# Patient Record
Sex: FEMALE | Race: WHITE | Employment: FULL TIME | URBAN - METROPOLITAN AREA
[De-identification: names, ages, dates, MRNs, and addresses within clinical notes are randomized per-mention and may not be internally consistent; named-entity substitution may affect disease eponyms.]

---

## 2020-11-20 ENCOUNTER — OFFICE VISIT (OUTPATIENT)
Dept: HEMATOLOGY | Age: 50
End: 2020-11-20
Payer: COMMERCIAL

## 2020-11-20 VITALS
WEIGHT: 154.2 LBS | DIASTOLIC BLOOD PRESSURE: 86 MMHG | TEMPERATURE: 98.1 F | OXYGEN SATURATION: 99 % | SYSTOLIC BLOOD PRESSURE: 124 MMHG | RESPIRATION RATE: 16 BRPM | HEART RATE: 76 BPM | HEIGHT: 69 IN | BODY MASS INDEX: 22.84 KG/M2

## 2020-11-20 DIAGNOSIS — Q44.6 POLYCYSTIC LIVER DISEASE: Primary | ICD-10-CM

## 2020-11-20 PROBLEM — Q61.3 POLYCYSTIC KIDNEY: Status: ACTIVE | Noted: 2020-11-20

## 2020-11-20 PROBLEM — I10 HYPERTENSION: Status: ACTIVE | Noted: 2020-11-20

## 2020-11-20 LAB
HBV SURFACE AB SER QL: NONREACTIVE
HBV SURFACE AB SER-ACNC: <3.1 MIU/ML
HBV SURFACE AG SER QL: 0.39 INDEX
HBV SURFACE AG SER QL: NEGATIVE

## 2020-11-20 PROCEDURE — 99203 OFFICE O/P NEW LOW 30 MIN: CPT | Performed by: INTERNAL MEDICINE

## 2020-11-20 RX ORDER — LOSARTAN POTASSIUM 25 MG/1
TABLET ORAL
COMMUNITY

## 2020-11-20 RX ORDER — FLUCONAZOLE 100 MG/1
TABLET ORAL
COMMUNITY
Start: 2020-11-04

## 2020-11-20 NOTE — Clinical Note
11/29/20 Patient: Pascual Angela YOB: 1970 Date of Visit: 11/20/2020 Candice Jaffe MD 
[place] [address] [address] [facility] [id number] VIA In Basket Dear Candice Jaffe MD, Thank you for referring Ms. Pascual Angela to [address] for evaluation. My notes for this consultation are attached. If you have questions, please do not hesitate to call me. I look forward to following your patient along with you. Sincerely, Royer Chopra MD

## 2020-11-20 NOTE — PROGRESS NOTES
Vianca Hudson MD, [address], Anna Brian MD, MPH      REJI Henson, [facility]-BC     Leila Hanson, [facility]   Chery Nolasco GM-LAUREN Beckman, [facility]       Susan Bello [address]    at [address], [facility] [place]  22.    [phone]    FAX: [address] [place] [address], [address]    [phone]    FAX: [phone]       Patient Care Team:  Unknown, Provider as PCP - General      Problem List  Never Reviewed          Codes Class Noted    Hypertension ICD-10-CM: I10  ICD-9-CM: 401.9  11/20/2020        Polycystic liver disease ICD-10-CM: Q44.6  ICD-9-CM: 751.62  11/20/2020        Polycystic kidney ICD-10-CM: Q61.3  ICD-9-CM: 753.12  11/20/2020                The clinicians listed above have asked me to see Andres Harden in consultation regarding polycystic liver disease. All medical records sent by the referring physicians were reviewed including imaging studies     The patient is a 52 y.o.  female who was found to have polycystic renal disease and liver diease in 2014. The patient has no symptoms which can be attributed to the liver disorder. The patient is not currently experiencing the following symptoms of liver disease:    pain in the right side over the liver,     The patient completes all daily activities without any functional limitations. ASSESSMENT AND PLAN:  Polycystic liver disease   The patient has innumerable cysts of varying sizes throughout out the entire liver. The liver is enlarged but is not disfiguring the abdomen.     Liver transaminases are normal.  ALP is normal.  Liver function is normal.  The platelet count is normal.      Serologic testing for causes of chronic liver disease was ordered. Patients with polycystic liver disease never develop liver dysfunction and the cysts do not lead to liver failure or cirrhosis. These patients can develop massive hepatomegaly from all the cysts which can be both disfiguring and uncomfortable from severe abdominal distention. Surgical resection of the cysts is not possible since they are throughout the liver and if only a part of the liver is resected it will regrow with cysts. Routine scanning of the liver to assess for a change in size of the cysts is not indicated or helpful in the management. If the patient is a candidate for renal trnasplant she should undergo combined liver and renal transplant. Screening for Hepatocellular Carcinoma  HCC screening is not necessary     Treatment of other medical problems in patients with chronic liver disease  There are no contraindications for the patient to take most medications that are necessary for treatment of other medical issues. Vaccinations   The need for vaccination against viral hepatitis A and B will be assessed with serologic and instituted as appropriate. Routine vaccinations against other bacterial and viral agents can be performed as indicated. Annual flu vaccination should be administered if indicated. ALLERGIES  Allergies   Allergen Reactions    Hydrochlorothiazide Cough    Lisinopril Cough       MEDICATIONS  Current Outpatient Medications   Medication Sig    fluconazole (DIFLUCAN) 100 mg tablet TAKE 1 TABLET BY MOUTH EVERY DAY FOR 7 DAYS    losartan (COZAAR) 25 mg tablet losartan 25 mg tablet   Take 1 tablet every day by oral route for 90 days. No current facility-administered medications for this visit. SYSTEM REVIEW NOT RELATED TO LIVER DISEASE OR REVIEWED ABOVE:  Constitution systems: Negative for fever, chills, weight gain, weight loss. Eyes: Negative for visual changes.   ENT: Negative for sore throat, painful swallowing. Respiratory: Negative for cough, hemoptysis, SOB. Cardiology: Negative for chest pain, palpitations. GI:  Negative for constipation or diarrhea. : Negative for urinary frequency, dysuria, hematuria, nocturia. Skin: Negative for rash. Hematology: Negative for easy bruising, blood clots. Musculo-skelatal: Negative for back pain, muscle pain, weakness. Neurologic: Negative for headaches, dizziness, vertigo, memory problems not related to HE. Psychology: Negative for anxiety, depression. FAMILY HISTORY:  The father  and had polycystic kidney disease. The mother Has/had the following chronic disease(s): heart disease. There is no family history of liver disease. SOCIAL HISTORY:  The patient is . The patient has 1 child, 2 stepchildren,   The patient stopped using tobacco products in 2017    The patient consumes 3 alcoholic beverages per week. The patient currently works full time as  for [employer]. PHYSICAL EXAMINATION:  VS: per nursing note  General: No acute distress. Eyes: Sclera anicteric. ENT: No oral lesions. Thyroid normal.  Nodes: No adenopathy. Skin: No spider angiomata. No jaundice. No palmar erythema. Respiratory: Lungs clear to auscultation. Cardiovascular: Regular heart rate. No murmurs. No JVD. Abdomen: Soft non-tender, liver size normal to percussion/palpation. Spleen not palpable. No obvious ascites. Extremities: No edema. No muscle wasting. No gross arthritic changes. Neurologic: Alert and oriented. Cranial nerves grossly intact. No asterixis. PHYSICAL EXAMINATION:  Visit Vitals  /86 (BP 1 Location: Right arm, BP Patient Position: Sitting)   Pulse 76   Temp 98.1 °F (36.7 °C) (Temporal)   Resp 16   Ht 5' 9\" (1.753 m)   Wt 154 lb 3.2 oz (69.9 kg)   SpO2 99%   BMI 22.77 kg/m²     General: No acute distress. Eyes: Sclera anicteric. ENT: No oral lesions.   Thyroid normal.  Nodes: No adenopathy. Skin: No spider angiomata. No jaundice. No palmar erythema. Respiratory: Lungs clear to auscultation. Cardiovascular: Regular heart rate. No murmurs. No JVD. Abdomen: Soft non-tender. Liver size normal to percussion/palpation. Spleen not palpable. No obvious ascites. Extremities: No edema. No muscle wasting. No gross arthritic changes. Neurologic: Alert and oriented. Cranial nerves grossly intact. No asterixis. LABORATORY STUDIES:  From 10/2020  AST/ALT/ALP/T Bili/ALB:  17/13/42/0.4/4.1  WBC/HB/PLT/INR:  NA/BUN/CREAT:  22/1.01    SEROLOGIES:  Not available or performed. Testing was performed today. LIVER HISTOLOGY:  Not available or performed    ENDOSCOPIC PROCEDURES:  Not available or performed    RADIOLOGY:  3/2020. CT scan abdomen without IV contrast.  Innumerable liver cysts consistent with polycystic liver disease. 9/2020. Ultrasound of liver. Normal appearing liver. Numerous cysts throughout the liver. OTHER TESTING:  Not available or performed    FOLLOW-UP:  All of the issues listed above in the Assessment and Plan were discussed with the patient. All questions were answered. The patient expressed a clear understanding of the above. No follow-up at [address] is needed. I would be glad to see the patient back for follow-up at any time in the future if the clinical situation changes.       MD Lizbeth GilliamOklahoma Hospital Association 13 of [address] [place] [address] A, [address] [place] [address] 22.  [phone]  [address]

## 2020-11-20 NOTE — PROGRESS NOTES
Identified pt with two pt identifiers(name and ). Reviewed record in preparation for visit and have obtained necessary documentation. Chief Complaint   Patient presents with    New Patient     Establish Care      Vitals:    20 0907   Temp: 98.1 °F (36.7 °C)   TempSrc: Temporal   Weight: 154 lb 3.2 oz (69.9 kg)   PainSc:   0 - No pain       Health Maintenance Review: Patient reminded of \"due or due soon\" health maintenance. I have asked the patient to contact his/her primary care provider (PCP) for follow-up on his/her health maintenance. Coordination of Care Questionnaire:  :   1) Have you been to an emergency room, urgent care, or hospitalized since your last visit? If yes, where when, and reason for visit? no       2. Have seen or consulted any other health care provider since your last visit? If yes, where when, and reason for visit? NO      Patient is accompanied by self I have received verbal consent from [facility] to discuss any/all medical information while they are present in the room.

## 2020-11-21 LAB
COMMENT, 144067: NORMAL
HBV CORE AB SERPL QL IA: NEGATIVE
HCV AB S/CO SERPL IA: <0.1 S/CO RATIO (ref 0–0.9)

## 2022-03-18 PROBLEM — I10 HYPERTENSION: Status: ACTIVE | Noted: 2020-11-20

## 2022-03-19 PROBLEM — Q61.3 POLYCYSTIC KIDNEY: Status: ACTIVE | Noted: 2020-11-20

## 2022-03-19 PROBLEM — Q44.6 POLYCYSTIC LIVER DISEASE: Status: ACTIVE | Noted: 2020-11-20

## 2023-05-15 RX ORDER — LOSARTAN POTASSIUM 25 MG/1
TABLET ORAL
COMMUNITY

## 2023-05-15 RX ORDER — FLUCONAZOLE 100 MG/1
TABLET ORAL
COMMUNITY
Start: 2020-11-04

## 2023-08-07 ENCOUNTER — OFFICE VISIT (OUTPATIENT)
Age: 53
End: 2023-08-07
Payer: COMMERCIAL

## 2023-08-07 VITALS
TEMPERATURE: 98.8 F | HEIGHT: 69 IN | RESPIRATION RATE: 18 BRPM | WEIGHT: 154.4 LBS | OXYGEN SATURATION: 96 % | DIASTOLIC BLOOD PRESSURE: 86 MMHG | HEART RATE: 68 BPM | BODY MASS INDEX: 22.87 KG/M2 | SYSTOLIC BLOOD PRESSURE: 113 MMHG

## 2023-08-07 DIAGNOSIS — K43.9 SUPRAUMBILICAL HERNIA: ICD-10-CM

## 2023-08-07 PROCEDURE — 3074F SYST BP LT 130 MM HG: CPT | Performed by: SURGERY

## 2023-08-07 PROCEDURE — 3078F DIAST BP <80 MM HG: CPT | Performed by: SURGERY

## 2023-08-07 PROCEDURE — 99203 OFFICE O/P NEW LOW 30 MIN: CPT | Performed by: SURGERY

## 2023-08-07 ASSESSMENT — PATIENT HEALTH QUESTIONNAIRE - PHQ9
SUM OF ALL RESPONSES TO PHQ9 QUESTIONS 1 & 2: 0
SUM OF ALL RESPONSES TO PHQ QUESTIONS 1-9: 0
SUM OF ALL RESPONSES TO PHQ QUESTIONS 1-9: 0
2. FEELING DOWN, DEPRESSED OR HOPELESS: 0
1. LITTLE INTEREST OR PLEASURE IN DOING THINGS: 0
SUM OF ALL RESPONSES TO PHQ QUESTIONS 1-9: 0
SUM OF ALL RESPONSES TO PHQ QUESTIONS 1-9: 0

## 2023-08-07 ASSESSMENT — ENCOUNTER SYMPTOMS
CONSTIPATION: 0
VOMITING: 0
NAUSEA: 0

## 2023-08-07 NOTE — PROGRESS NOTES
Iain Saul is a 50 y.o. female who presents for evaluation of a supraumbilical hernia. Information obtained from patient and review of outside records. Ms. Noe Oliver tells me that she has had an abdominal wall bulge, in the midline above the umbilicus for some time now. The bulge has become somewhat larger. No significant associated discomfort with coughing, sneezing or exertion. No h/o nausea or vomiting. No h/o chronic cough or constipation. Found to have a supraumbilical hernia on CT scan of the abdomen/pelvis in . However, a supraumbilical hernia was not reported on an abdominal CT scan in . She has otherwise been in her usual state of health. Abdominal ultrasound - 2020 - Normal gallbladder, hepatomegaly with innumerable cysts, enlarged right kidney with numerous cysts, small supraumbilical hernia with minimal fluid component. (By report. Films not available. )    CT scan abdomen/pelvis without po/IV contrast - 2022 - Autosomal dominant polycystic kidney disease with involvement of bilateral kidneys and liver. Bilateral renal calculi. Bilateral hyperdense renal lesions, possibly complex cysts, total renal volume 1285 ml, no evidence of ventral abdominal wall hernia. (By report. Films not available.)    Past Medical History:   Diagnosis Date    Hypertension     Supraumbilical hernia 3/3/8475    Ulcer duodenal hemorrhage      Past Surgical History:   Procedure Laterality Date    COLONOSCOPY      UPPER GASTROINTESTINAL ENDOSCOPY       History reviewed. No pertinent family history.     Social History     Socioeconomic History    Marital status:      Spouse name: None    Number of children: None    Years of education: None    Highest education level: None   Tobacco Use    Smoking status: Former     Packs/day: 0.50     Types: Cigarettes     Quit date: 2017     Years since quittin.6    Smokeless tobacco: Never   Substance and Sexual Activity